# Patient Record
Sex: FEMALE | Race: WHITE
[De-identification: names, ages, dates, MRNs, and addresses within clinical notes are randomized per-mention and may not be internally consistent; named-entity substitution may affect disease eponyms.]

---

## 2020-12-21 ENCOUNTER — HOSPITAL ENCOUNTER (OUTPATIENT)
Dept: HOSPITAL 63 - PMG | Age: 40
End: 2020-12-21
Attending: FAMILY MEDICINE
Payer: COMMERCIAL

## 2020-12-21 DIAGNOSIS — M47.816: Primary | ICD-10-CM

## 2020-12-21 DIAGNOSIS — M25.551: ICD-10-CM

## 2020-12-21 DIAGNOSIS — M43.17: ICD-10-CM

## 2020-12-21 DIAGNOSIS — M25.751: ICD-10-CM

## 2020-12-21 PROCEDURE — 72100 X-RAY EXAM L-S SPINE 2/3 VWS: CPT

## 2020-12-21 PROCEDURE — 73502 X-RAY EXAM HIP UNI 2-3 VIEWS: CPT

## 2021-08-19 ENCOUNTER — HOSPITAL ENCOUNTER (OUTPATIENT)
Dept: HOSPITAL 61 - PNCL | Age: 41
End: 2021-08-19
Attending: ANESTHESIOLOGY
Payer: COMMERCIAL

## 2021-08-19 DIAGNOSIS — M79.661: ICD-10-CM

## 2021-08-19 DIAGNOSIS — M54.5: Primary | ICD-10-CM

## 2021-08-19 PROCEDURE — 99214 OFFICE O/P EST MOD 30 MIN: CPT

## 2021-08-19 PROCEDURE — G0463 HOSPITAL OUTPT CLINIC VISIT: HCPCS

## 2021-08-19 NOTE — PDOC1
INITIAL PAIN CONSULT


DATE OF SERVICE:


DOS:


DATE: 21 


TIME: 13:01





CHIEF COMPLAINT:


Chief Complaint:


Low back and left greater than right lower extremity pain





HISTORY OF PRESENT ILLNESS:


41-year-old female presents with history of pain low back left lower extremity 

greater than right present since  for many years gradually increasing over 

the past 1 year or so.  Patient reports is not from any specific injury or 

accident she is aware but has increased pain in the low back and the left lower 

extremity posterior gluteus lateral thigh anterior thigh medial thigh worse with

walking standing changing positions better with sitting or resting.  Patient 

reports that it is not awaken her from sleep generally feels better with laying 

down and does not affect her bowel bladder control does affect her ability to 

walk although she is not use any assistive devices to ambulate.  Patient has had

physical therapy as well as epidural injections in the past at an outside 

facility  through  which were very helpful with each of these.  Patient 

has been doing physical therapy exercises that she learned from those sessions 

but is not been decreasing the pain at this time.  Patient reports her 

disability rating 0-10 10 at worst is a 6-7 with recreation 6 social activity 

occupation 5 with sexual behavior self-care and life support activities.  

Patient is been taking Norco as well as Flexeril also Percocet and Mobic which 

are helpful only minimally.  Patient scribes pain is intermittent intensity with

numbness radiating pain in the low back sharp stabbing throbbing shooting in the

back shooting in the leg radiating cramping and aching the low back as well as 

radiating into the left lower extremity as discussed.  Patient also has some 

pain in the base the neck and the right shoulder and right upper extremity in a 

radicular fashion which is secondary as she rates her low back and left leg pain

is much more problematic.  Patient has cervical and lumbar spine films showing 

degenerative disc changes most significantly at the lower L4-5 and L5-S1 levels 

with retrolisthesis of L5 and S1.





PAST MEDICAL HISTORY:


PMH:


Arthritis, bone spurs





PREVIOUS SURGERIES:


Past Surgical Hx:








CURRENT MEDICATIONS:


Current Meds:





Active Scripts








 Medications  Dose


 Route/Sig


 Max Daily Dose Days Date Category


 


 Cyclobenzaprine


 Hcl 10 Mg Tablet  1 Tab


 PO BID


   21 Reported


 


 Hydrocodone-Apap


   **


  (Hydrocodone


 Bit/Acetaminophen)


 1 Tab Tablet  1 Tab


 PO BID PRN


   21 Reported











FAMILY HISTORY:


Family Hx:


Cancer, heart disease, diabetes





SOCIAL HISTORY:


Social Hx:


Patient is alcohol 2-5 beverages a week does not smoke says any illegal illicit 

recreational drugs is single lives locally in [facility]





REVIEW OF SYSTEMS:


ROS:


Positive for those items mentioned in history of present illness, all systems 

are reviewed, otherwise negative ,and are complete full and well-documented on 

patient's chart.





PHYSICAL EXAM:


VS:


Blood pressure is 137/88 pulse 65 respirations 18 temperature is 98.3 F height 

is 5 feet 7 inches weight is 238 pounds


PE:


PHYSICAL EXAMINATION:





GENERAL: The patient is awake, alert, oriented, appropriate, very pleasant in 

demeanor.


HEENT: Shows normocephalic, atraumatic.  Extraocular movements are intact and 

symmetrical.  Oral cavity: Mucous membranes moist and pink.  Dentition is intact

.


NECK: Shows anterior throat supple without palpable lymphadenopathy noted.  

Swallow reflex symmetrical.


CHEST: Shows normal on inspection.  Breath sounds are clear bilaterally, no 

rales rhonchi or wheezes auscultated.


HEART: Shows S1, S2 clear.  No murmurs auscultated.


ABDOMEN: Soft, nontender, nondistended, obese.  No palpable organomegaly is 

noted.  


BACK: Shows spine grossly in the midline.  Normal-appearing cervical lordotic 

curvature.  Cervical paraspinous muscles show symmetrical inspection of the p

atient is moderate tenderness diffusely in the inferior aspect of the 

paraspinous muscular more right than left also the superior medial trapezius on 

the right side.  Patient shows good rotation motion cervical spine with lateral 

as well as full extension full forward flexion without significant difficulty.  

There is slightly increased thoracic kyphosis, some minor flattening of the 

lumbar lordotic curvature.  Lumbar paraspinous muscles show symmetrical on 

inspection, on palpation shows some moderate tenderness diffusely throughout the

upper, middle and lower distribution of the paraspinous muscles bilaterally and 

also into the lower thoracic paraspinous musculature, firm and tender, but 

without specific trigger points, without radiation of pain.  The patient has 

good rotational motion of the lumbar spine, both laterally as well as extension 

and flexion without significant difficulty.  No tenderness over the spinous 

processes, sacrum or sacroiliac regions.


EXTREMITIES: Lower extremities show deep tendon reflexes 1+ in the patellar and 

tendo calcaneus tendons.  Motor exam is 5 on a scale of 5 with right 

dorsiflexion, extension, quadriceps and hamstring flexion and 4/5 on the left.  

Peripheral pulses are 1+ posterior tibial.  No peripheral edema is noted bilater

ally.  Lower extremities are warm and dry to touch, equal in color and 

appearance.  Straight leg raise noted to be positive on the left at approximate 

45 degrees decreased with knee flexion, right side is negative.  Gaenslen's and 

Avelino's maneuvers are negative bilateral as well.  The patient is able to 

stand, stand on her toes without significant difficulty or loss of balance, w

alks with a normal-appearing gait does not appear to favor the right or left 

lower extremity significantly is not using any assistive devices to ambulate.  

Upper extremity show deep tendon reflexes 2+ in the bicep triceps tendons, motor

exam strong with  strength rated 5 out of 5 as is bicep and tricep flexion. 

Shoulder shrug strong intact without loss of strength on resistance bilaterally.


SKIN: Shows warm and dry, good turgor.  No edema.  No sores, rashes or bruising 

throughout.





IMPRESSION:


Impression:


41-year-old female with long history low back left lower extremity pain and 

radicular fashion


Neck and right upper extremity pain in a radicular fashion


Plain films x-rays lumbar and cervical spines as noted


Arthritis





Plan: Options were discussed with the patient including conservative medical 

management continued physical therapies and interventional techniques.  Patient 

is currently doing physical therapy exercises taking oral analgesics without 

significant improvement, would like to pursue interventional techniques as these

have worked well for her in the past.  We discussed a lumbar epidural steroid 

injection using description as well as anatomical models to describe the 

procedure.  Patient will wait for preauthorization with insurance provider once 

the team will return for translaminar epidural steroid injection at the L4-5 

level for her L4-5 left-sided radiculopathy.











INOCENCIA LRAA MD               Aug 19, 2021 13:09

## 2022-01-13 ENCOUNTER — HOSPITAL ENCOUNTER (OUTPATIENT)
Dept: HOSPITAL 61 - PNCL | Age: 42
Discharge: HOME | End: 2022-01-13
Attending: ANESTHESIOLOGY
Payer: COMMERCIAL

## 2022-01-13 DIAGNOSIS — M51.16: Primary | ICD-10-CM

## 2022-01-13 DIAGNOSIS — Z79.899: ICD-10-CM

## 2022-01-13 PROCEDURE — 62323 NJX INTERLAMINAR LMBR/SAC: CPT

## 2022-01-13 NOTE — PDOC
Progress Note - Pain Clinic


Date of Service:


DOS:


DATE: 1/13/22 


TIME: 14:41





Diagnosis:


Dx:


Lumbar radiculopathy with lumbar degenerative disc disease





History or Present Illness:


HPI:


41-year-old female returns for follow-up status post evaluation and 

preauthorization for lumbar epidural steroid injection patient reports still 

significant pain in her low back and into the left lower extremity posterior 

gluteus posterior lateral thigh lateral anterior thigh anteromedial thigh medial

lower leg to the foot patient reports some pain on the right side mostly on the 

worst on the left patient reports is aching and sharp tight shooting Burning 

stabbing radiating constant can be severe and unbearable with walking and 

standing.  Patient reports new finding of pain base the neck and shoulders with 

pain rating mostly in the right upper extremity patient reports this is gotten 

much worse since she was last here does have a MRI scan ordered but is not been 

performed as of yet.  Patient reports is radiating to the right upper extremity 

with some tingling and numbness in the right arm as well patient rates that as a

9 on scale 10 at all times worse with activity repetitive motions reaching 

overhead with the right hand as well.  Patient reports no bowel or bladder 

incontinence.





Physical Exam:


VS:


Blood pressure is 126/86 pulse 81 respirations 18 temperature 98.0 F weight is 

230 pounds


PE:


PHYSICAL EXAMINATION:





GENERAL: The patient is awake, alert, oriented, appropriate, very pleasant in 

demeanor


HEENT: Shows normocephalic, atraumatic.  Extraocular movements are intact and 

symmetrical.  Oral cavity: Mucous membranes moist and pink.  Dentition is 

intact.


NECK: Shows anterior throat supple without palpable lymphadenopathy noted.  

Swallow reflex symmetrical.


CHEST: Shows normal on inspection.  Breath sounds are clear bilaterally, no 

rales or rhonchi.


HEART: Shows S1, S2 clear.  No murmurs auscultated.


ABDOMEN: Soft, nontender, nondistended.  No palpable organomegaly is noted.


BACK: Shows spine grossly in the midline.  Normal-appearing cervical lordotic 

curvature.  Cervical paraspinous muscles show symmetrical with inspection, on 

palpation some moderate tenderness diffusely bilaterally diffusely without 

significant radiation more on the right than the left as well as into the 

superior medial trapezius.  Patient shows full rotation of motion cervical spine

both laterally as well as extension and flexion without significant difficulty. 

There is slightly increased thoracic kyphosis, some minor flattening of the 

lumbar lordotic curvature.  Lumbar paraspinous muscles show symmetrical on 

inspection, on palpation shows some moderate tenderness diffusely throughout the

upper, middle and lower distribution of the paraspinous muscles without specific

trigger points, without radiation of pain.  The patient has good rotational 

motion of the lumbar spine, both laterally as well as extension and flexion 

without significant difficulty. 


EXTREMITIES: Lower extremities show deep tendon reflexes 1+ in the patellar and 

tendo calcaneus tendons.  Motor exam is 4 on a scale of 5 with right 

dorsiflexion, extension, quadriceps and hamstring flexion and 5/5 on the left.  

Peripheral pulses are 1+ posterior tibial.  No peripheral edema is noted 

bilaterally.  Lower extremities are warm and dry to touch, equal in color and 

appearance.  Upper extremities show deep tendon reflexes 2+ in the bicep and tr

icep tendons, motor exam is approximately 4 to scale 5 on the right with  

strength bicep tricep flexion 5 out of 5 on the left.  Shoulder shrug strong and

intact without loss of strength on resistance bilaterally.


SKIN: Shows warm and dry, good turgor.  No edema.  No sores, rashes or bruising 

throughout.





Procedure:


Procedure:


Options were discussed with the patient.  Patient's old chart was reviewed as 

her current medication regimen updated current review of systems updated today 

as well.  We will proceed with a lumbar epidural steroid injection today with 

fluoroscopic guidance.  Risks were discussed including but not limited to: 

Bleeding, infection, possibility of epidural hematoma and subsequent 

neurological compromise, dural puncture, headaches, spinal cord and/or nerve 

damage, side effects of steroid medication, and poor results regarding pain 

control.  Patient understands and wished to proceed.  Patient will return to the

clinic in approximately 2 weeks for follow-up, was counseled as to return 

appointment, activity level, and side effects to be aware of.





Medication Injected:


Med Injected:


Procedure is lumbar epidural steroid injection under local anesthetic using 

sterile prep and drape at the L4-5 level using C-arm fluoroscopic guidance in 

both AP and lateral views medications injected is 120 mg Depo-Medrol +10mL 

preservative-free normal saline and 2 mL contrast- condition at discharge is 

stable patient tolerated procedure well had no complications.





Condition at Discharge:


Condition at Discharge:


Condition at discharge stable, patient tolerated procedure well and had no 

complications.











INOCENCIA LARA MD               Jan 13, 2022 14:46

## 2022-01-13 NOTE — PDOC4
Procedure Note:


ICD 10 Code:


ICD 10 Code:


M54.16


M51.36





Procedure Note:


Patient was consented for lumbar epidural steroid injection fluoroscopic 

guidance.  Risks were discussed including but not limited to: Bleeding, 

infection, possibility of epidural hematoma and subsequent neurological 

compromise, dural puncture, headaches, spinal cord and/or nerve damage, side 

effects of steroid medication, and poor results regarding pain control.  Patient

understands and wished to proceed.


Procedure is lumbar epidural steroid injection under local anesthetic using 

sterile prep and drape at the L4-5 level using C-arm fluoroscopic guidance in 

both AP and lateral views medications injected is 120 mg Depo-Medrol +10mL 

preservative-free normal saline and 2 mL contrast- condition at discharge is 

stable patient tolerated procedure well had no complications.











INOCENCIA LARA MD               Jan 13, 2022 14:47

## 2022-01-19 ENCOUNTER — HOSPITAL ENCOUNTER (OUTPATIENT)
Dept: HOSPITAL 61 - PNCL | Age: 42
Discharge: HOME | End: 2022-01-19
Attending: ANESTHESIOLOGY
Payer: COMMERCIAL

## 2022-01-19 DIAGNOSIS — Z79.899: ICD-10-CM

## 2022-01-19 DIAGNOSIS — M51.16: Primary | ICD-10-CM

## 2022-01-19 PROCEDURE — G0463 HOSPITAL OUTPT CLINIC VISIT: HCPCS

## 2022-01-19 PROCEDURE — 99212 OFFICE O/P EST SF 10 MIN: CPT

## 2022-01-19 NOTE — PDOC
Progress Note - Pain Clinic


Date of Service:


DOS:


DATE: 1/19/22 


TIME: 13:38





Diagnosis:


Dx:


Lumbar radiculopathy with lumbar degenerative disc disease





History or Present Illness:


HPI:


Telemedicine visit today with patient's identity verified with full date of 

birth as well as full name, total time spent 14 minutes and


41-year-old female follow-up status post lumbar epidural steroid injection x1.  

Patient reports near 100% improvement in her right low back and left lower 

extremity pain is increasing her activity with distance walking doing household 

activities work activities sleeping better at night traveling with greater ease 

and comfort patient reports still some pain in the low back left lower extremity

posterior gluteus posterior lateral thigh lateral anterior thigh and anterior 

medial lower leg but much improved again near 100%, with no new motor or sensory

deficits no bowel or bladder incontinence.  Patient reports she has been 

decreasing her hydrocodone significantly and has had a few days where she is not

used none of it also is increasing her activity with greater standing for longer

periods of walking performing her work activities much greater ease and again 

sleeping much better at night.  Patient is very pleased with her progress thus 

far.  Patient reports still significant radiation of pain in an L4-5 dermatomal 

distribution on the left and we will preauthorize patient for a second lumbar 

epidural steroid injection patient doing very well after the first injection but

with still some persistent pain in the left L4-5 dermatomal distribution in a 

radicular fashion although significantly improved overall.  Once approved, 

patient will return for a translaminar approach L4-5 level lumbar epidural 

steroid injection with fluoroscopic guidance.  In the meantime, patient will 

continue with stretching strength exercises daily and oral analgesics as 

necessary.





Physical Exam:


PE:














INOCENCIA LARA MD               Jan 19, 2022 13:41

## 2022-01-19 NOTE — NUR
Pt. called clinic today, she cannot get off work to fulfill her office visit today post 
epidural steroid injection treatment. Pt. states she is 100% improved.  Able to sleep and 
work much better.  Pt. has had no side effects from the treatment or any new health issues.  
Pt. has a follow up appt in early Feb. and was instructed to cancel that appt if she is 
still doing this much better from the ANDREA.  We will still, in the meantime, request an 
additional PA from her insurance carrier for a second ANDREA if needed.  Pt. VINICIO.  Pt. 
transferred to Dr. Honeycutt for a phone interview.   SHINE Prasad RN

## 2022-02-25 ENCOUNTER — HOSPITAL ENCOUNTER (OUTPATIENT)
Dept: HOSPITAL 61 - PNCL | Age: 42
Discharge: HOME | End: 2022-02-25
Attending: ANESTHESIOLOGY
Payer: COMMERCIAL

## 2022-02-25 DIAGNOSIS — Z79.899: ICD-10-CM

## 2022-02-25 DIAGNOSIS — Z88.8: ICD-10-CM

## 2022-02-25 DIAGNOSIS — M51.16: Primary | ICD-10-CM

## 2022-02-25 PROCEDURE — 62323 NJX INTERLAMINAR LMBR/SAC: CPT

## 2022-02-25 NOTE — PDOC4
Procedure Note:


ICD 10 Code:


ICD 10 Code:


M54.16


M51.36





Procedure Note:


Patient was consented for lumbar epidural steroid injection fluoroscopic 

guidance.  Risks were discussed including but not limited to: Bleeding, 

infection, possibility of epidural hematoma and subsequent neurological 

compromise, dural puncture, headaches, spinal cord and/or nerve damage, side 

effects of steroid medication, and poor results regarding pain control.  Patient

understands and wished to proceed.


Procedure is lumbar epidural steroid injection under local anesthetic using 

sterile prep and drape at the L4-5 level using C-arm fluoroscopic guidance in 

both AP and lateral views medications injected is 20 mg dexamethasone +10mL 

preservative-free normal saline and 2 mL contrast- condition at discharge is 

stable patient tolerated procedure well had no complications.











INOCENCIA LARA MD               Feb 25, 2022 13:39

## 2022-02-25 NOTE — PDOC
Progress Note - Pain Clinic


Date of Service:


DOS:


DATE: 2/25/22 


TIME: 13:34





Diagnosis:


Dx:


Lumbar radiculopathy with lumbar degenerative disc disease


Cervical radiculopathy





History or Present Illness:


HPI:


41-year-old female returns for follow-up status post lumbar epidural steroid 

injection x1 patient reports did very well with that 100% improvement for period

of time pain returned though over the past few weeks with pain in the low back 

and left lower extremity posterior gluteus posterior lateral thigh lateral 

anterior thigh anteromedial thigh on the left side patient reports also new pain

at the base of the neck and right upper extremity pain radiating the right arm 

in the posterior deltoid triceps biceps into the forearm and hand which is 

becoming much worse with numbness and tingling in the hand as well patient 

reports that the pain in the low back is on the left side the shoulders on the 

right side as well as the neck and right arm patient reports her pain is a 9 on 

scale 10 is worse over the past week 6-7 on average of 3 to [place] is a 7 

today patient scribes aching dull tight and sharp shooting in the leg shooting 

in the right arm burning and cramping in the base of neck and shoulder as well 

as in the low back.  Patient reports no loss of motor function with significant 

fatigability with the left leg as well as the right arm with repetitive motions 

reaching overhead with the right arm and reaching forward with weightbearing.  

Patient reports the pain is returning in the low back and left leg still not to 

baseline patient reports no bowel or bladder incontinence no loss of motor 

function with significant fatigability left lower extremity as well as the right

upper extremity in the right upper extremity disturbing sleep more now in the 

leg and back.  Patient reports her mobility was better after last injection note

distance walking doing household activities work activities and try with greater

ease and comfort as well sleeping better at night.





Physical Exam:


VS:


Blood pressure is 128/85 pulse 87 respirations 16 temperature is 98.1 F height 

5 feet 8 inches weight is 227 pounds.


PE:


PHYSICAL EXAMINATION:





GENERAL: The patient is awake, alert, oriented, appropriate, very pleasant in 

demeanor


HEENT: Shows normocephalic, atraumatic.  Extraocular movements are intact and 

symmetrical.  Oral cavity: Mucous membranes moist and pink.  Dentition is i

ntact.


NECK: Shows anterior throat supple without palpable lymphadenopathy noted.  

Swallow reflex symmetrical.


CHEST: Shows normal on inspection.  Breath sounds are clear bilaterally, no 

rales or rhonchi.


HEART: Shows S1, S2 clear.  No murmurs auscultated.


ABDOMEN: Soft, nontender, nondistended.  No palpable organomegaly is noted. 


BACK: Shows spine grossly in the midline.  Normal-appearing cervical lordotic 

curvature.  Cervical paraspinous muscles show symmetrical inspection, palpation 

some moderate tenderness diffusely at the upper middle lower decrease the 

paraspinous muscles more to the right superior medial trapezius without trigger 

points but with significant tenderness on the right side.  Patient shows good 

rotation motion cervical spine with lateral as well as full extension full 

forward flexion without significant increase in pain.  There is slightly 

increased thoracic kyphosis, some minor flattening of the lumbar lordotic 

curvature.  Lumbar paraspinous muscles show symmetrical on inspection, on palp

ation shows some moderate tenderness diffusely throughout the upper, middle and 

lower distribution of the paraspinous muscles without specific trigger points, 

without radiation of pain.  The patient has good rotational motion of the lumbar

spine, both laterally as well as extension and flexion without significant 

difficulty.  No tenderness over the spinous processes, sacrum or sacroiliac 

regions.


EXTREMITIES: Lower extremities show deep tendon reflexes 1+ in the patellar and 

tendo calcaneus tendons.  Motor exam is 5 on a scale of 5 with right 

dorsiflexion, extension, quadriceps and hamstring flexion and 4/5 on the left.  

Peripheral pulses are 1+ posterior tibial.  No peripheral edema is noted 

bilaterally.  Lower extremities are warm and dry to touch, equal in color and 

appearance.  Upper extremity show deep tendon reflexes 2+ in the bicep triceps 

tendons, motor exam is 5 out of 5 on the left and 4-5 on the right with  

strength bicep and tricep flexion.  Shoulder shrug is strong and intact without 

loss of strength on resistance bilaterally.


SKIN: Shows warm and dry, good turgor.  No edema.  No sores, rashes or bruising 

throughout.





Procedure:


Procedure:


Options discussed with patient.  Patient's old chart was reviewed as her current

medication regimen updated current review of systems updated today as well.  We 

will proceed with a lumbar epidural steroid injection today with fluoroscopic 

guidance.  Risks were discussed including but not limited to: Bleeding, 

infection, possibility of epidural hematoma and subsequent neurological 

compromise, dural puncture, headaches, spinal cord and/or nerve damage, side 

effects of steroid medication, and poor results regarding pain control.  Patient

understands and wished to proceed.  Patient will return to the clinic in 

approximate 2 weeks for follow-up, was counseled as to return appointment, 

Activella, and side effect to be aware of.


We will order MRI scan cervical spine to better differentiate patient's right 

radicular quality pain.  Patient also be given a note for work diagnosis and 

with excuse for attending her visit today.





Medication Injected:


Med Injected:


Procedure is lumbar epidural steroid injection under local anesthetic using 

sterile prep and drape at the L4-5 level using C-arm fluoroscopic guidance in 

both AP and lateral views medications injected is 20 mg dexamethasone +10mL 

preservative-free normal saline and 2 mL contrast- condition at discharge is 

stable patient tolerated procedure well had no complications.





Condition at Discharge:


Condition at Discharge:


Condition at discharge stable, paced tolerated the procedure well and had no 

complications.











INOCENCIA LARA MD               Feb 25, 2022 13:38